# Patient Record
Sex: MALE | Race: WHITE
[De-identification: names, ages, dates, MRNs, and addresses within clinical notes are randomized per-mention and may not be internally consistent; named-entity substitution may affect disease eponyms.]

---

## 2018-02-06 ENCOUNTER — HOSPITAL ENCOUNTER (OUTPATIENT)
Dept: HOSPITAL 62 - RAD | Age: 33
End: 2018-02-06
Attending: PHYSICIAN ASSISTANT
Payer: COMMERCIAL

## 2018-02-06 DIAGNOSIS — R94.5: Primary | ICD-10-CM

## 2018-02-06 PROCEDURE — 76705 ECHO EXAM OF ABDOMEN: CPT

## 2018-02-06 NOTE — RADIOLOGY REPORT (SQ)
EXAM DESCRIPTION:  U/S ABDOMEN LIMITED W/O DOP



COMPLETED DATE/TIME:  2/6/2018 8:01 am



REASON FOR STUDY:  ABN LFT (R94.5) R94.5  ABNORMAL RESULTS OF LIVER FUNCTION STUDIES



COMPARISON:  None.



TECHNIQUE:  Dynamic and static grayscale images acquired of the abdomen and recorded on PACS. Additio
nal selected color Doppler and spectral images recorded.



LIMITATIONS:  None.



FINDINGS:  PANCREAS: Midline pancreas unremarkable

LIVER: No masses. Echotexture normal.

LIVER VASCULATURE: Normal directional flow of the main portal vein and hepatic veins.

GALLBLADDER: No stones. Normal wall thickness. No pericholecystic fluid.

ULTRASOUND-DETECTED ROGERS'S SIGN: Negative.

INTRAHEPATIC DUCTS AND COMMON DUCT: CBD and intrahepatic ducts normal caliber. No filling defects.

INFERIOR VENA CAVA: Normal flow.

AORTA: No aneurysm.

RIGHT KIDNEY:  Normal size. Normal echogenicity. No solid or suspicious masses. No hydronephrosis. No
 calcifications.

PERITONEAL AND RIGHT PLEURAL SPACE: No ascites or effusions.

OTHER: No other significant findings.



IMPRESSION:  NORMAL RIGHT UPPER QUADRANT ULTRASOUND.



TECHNICAL DOCUMENTATION:  JOB ID:  [id number]

 2011 [employer]- All Rights Reserved

## 2019-12-11 ENCOUNTER — HOSPITAL ENCOUNTER (OUTPATIENT)
Dept: HOSPITAL 62 - RAD | Age: 34
End: 2019-12-11
Attending: PHYSICIAN ASSISTANT
Payer: COMMERCIAL

## 2019-12-11 DIAGNOSIS — K76.0: Primary | ICD-10-CM

## 2019-12-11 DIAGNOSIS — R94.5: ICD-10-CM

## 2019-12-11 PROCEDURE — 76705 ECHO EXAM OF ABDOMEN: CPT

## 2019-12-11 NOTE — RADIOLOGY REPORT (SQ)
EXAM DESCRIPTION:  U/S ABDOMEN LIMITED W/O DOP



COMPLETED DATE/TIME:  12/11/2019 8:34 am



REASON FOR STUDY:  ABNORMAL LIVER ENZYMES R94.5  ABNORMAL RESULTS OF LIVER FUNCTION STUDIES



COMPARISON:  2/6/2018.



TECHNIQUE:  Dynamic and static grayscale images acquired of the abdomen and recorded on PACS. Additio
nal selected color Doppler and spectral images recorded.

Note:  Exam does not meet criteria for a complete doppler/duplex scan



LIMITATIONS:  Study limited due to acoustical interference from fat or from air in the bowel.



FINDINGS:  PANCREAS: Poorly seen secondary to acoustical interference from fat or from air in the bow
el. No visualized masses.  Duct normal caliber as seen.

LIVER: Echotexture is coarse with increased echogenicity consistent with fatty infiltration.

LIVER VASCULATURE: Normal directional flow of the main portal vein and hepatic veins.

GALLBLADDER: No stones. Normal wall thickness. No pericholecystic fluid.

ULTRASOUND-DETECTED ROGERS'S SIGN: Negative.

INTRAHEPATIC DUCTS AND COMMON DUCT: The extrahepatic bile ducts are obscured by bowel gas.  No notice
able intrahepatic biliary dilation.

INFERIOR VENA CAVA: Normal flow.

AORTA: No aneurysm.

RIGHT KIDNEY:  Normal size. Normal echogenicity. No solid or suspicious masses. No hydronephrosis. No
 calcifications.

PERITONEAL AND PLEURAL SPACES: No ascites or effusions.

OTHER: No other significant finding.



IMPRESSION:  FATTY INFILTRATION OF THE LIVER. NO OTHER SIGNIFICANT FINDING IN THE VISUALIZED ABDOMEN.




TECHNICAL DOCUMENTATION:  JOB ID:  [id number]

 2011 [employer]- All Rights Reserved



Reading location - IP/workstation name: [facility]